# Patient Record
Sex: MALE | Race: WHITE
[De-identification: names, ages, dates, MRNs, and addresses within clinical notes are randomized per-mention and may not be internally consistent; named-entity substitution may affect disease eponyms.]

---

## 2020-06-19 ENCOUNTER — HOSPITAL ENCOUNTER (OUTPATIENT)
Dept: HOSPITAL 19 - SDCO | Age: 50
Discharge: HOME | End: 2020-06-19
Attending: SPECIALIST
Payer: MEDICAID

## 2020-06-19 VITALS — DIASTOLIC BLOOD PRESSURE: 84 MMHG | TEMPERATURE: 98.2 F | SYSTOLIC BLOOD PRESSURE: 135 MMHG | HEART RATE: 78 BPM

## 2020-06-19 VITALS — TEMPERATURE: 97.8 F | SYSTOLIC BLOOD PRESSURE: 129 MMHG | DIASTOLIC BLOOD PRESSURE: 83 MMHG | HEART RATE: 72 BPM

## 2020-06-19 VITALS — BODY MASS INDEX: 40.77 KG/M2 | WEIGHT: 291.23 LBS | HEIGHT: 71 IN

## 2020-06-19 VITALS — HEART RATE: 65 BPM | SYSTOLIC BLOOD PRESSURE: 129 MMHG | DIASTOLIC BLOOD PRESSURE: 87 MMHG

## 2020-06-19 DIAGNOSIS — F41.9: ICD-10-CM

## 2020-06-19 DIAGNOSIS — G47.33: ICD-10-CM

## 2020-06-19 DIAGNOSIS — F32.9: ICD-10-CM

## 2020-06-19 DIAGNOSIS — F43.10: ICD-10-CM

## 2020-06-19 DIAGNOSIS — Z87.891: ICD-10-CM

## 2020-06-19 DIAGNOSIS — G89.29: ICD-10-CM

## 2020-06-19 DIAGNOSIS — Z88.0: ICD-10-CM

## 2020-06-19 DIAGNOSIS — K21.9: ICD-10-CM

## 2020-06-19 DIAGNOSIS — K29.30: Primary | ICD-10-CM

## 2020-06-19 DIAGNOSIS — J44.9: ICD-10-CM

## 2020-06-19 DIAGNOSIS — I10: ICD-10-CM

## 2020-06-19 DIAGNOSIS — Z79.82: ICD-10-CM

## 2020-06-19 DIAGNOSIS — E66.9: ICD-10-CM

## 2020-06-19 NOTE — NUR
TO RM 6 PER CART FROM ENDOSCOPY. ALERT ORIENTED X3, TALKING TO STAFF AND HIS
WIFE. AMBULATED TO RECLINER. RECEIVED MUFFIN AND WATER.

## 2020-07-29 ENCOUNTER — HOSPITAL ENCOUNTER (OUTPATIENT)
Dept: HOSPITAL 19 - MHCPAIN | Age: 50
End: 2020-07-29
Attending: ANESTHESIOLOGY
Payer: MEDICAID

## 2020-07-29 DIAGNOSIS — M54.2: ICD-10-CM

## 2020-07-29 DIAGNOSIS — M54.5: ICD-10-CM

## 2020-07-29 DIAGNOSIS — M53.3: ICD-10-CM

## 2020-07-29 DIAGNOSIS — G89.29: ICD-10-CM

## 2020-07-29 DIAGNOSIS — M47.817: Primary | ICD-10-CM

## 2020-07-29 PROCEDURE — G0463 HOSPITAL OUTPT CLINIC VISIT: HCPCS
